# Patient Record
Sex: MALE | Race: OTHER
[De-identification: names, ages, dates, MRNs, and addresses within clinical notes are randomized per-mention and may not be internally consistent; named-entity substitution may affect disease eponyms.]

---

## 2019-02-24 ENCOUNTER — HOSPITAL ENCOUNTER (EMERGENCY)
Dept: HOSPITAL 80 - FED | Age: 59
Discharge: HOME | End: 2019-02-24
Payer: COMMERCIAL

## 2019-02-24 VITALS — DIASTOLIC BLOOD PRESSURE: 84 MMHG | SYSTOLIC BLOOD PRESSURE: 149 MMHG

## 2019-02-24 DIAGNOSIS — R07.89: Primary | ICD-10-CM

## 2019-02-24 DIAGNOSIS — E78.5: ICD-10-CM

## 2019-02-24 DIAGNOSIS — I10: ICD-10-CM

## 2019-02-24 DIAGNOSIS — E86.9: ICD-10-CM

## 2019-02-24 LAB — PLATELET # BLD: 336 10^3/UL (ref 150–400)

## 2019-02-24 NOTE — CPEKG
Test Reason : OPEN

Blood Pressure : ***/*** mmHG

Vent. Rate : 077 BPM     Atrial Rate : 077 BPM

   P-R Int : 159 ms          QRS Dur : 090 ms

    QT Int : 384 ms       P-R-T Axes : 039 -12 054 degrees

   QTc Int : 435 ms

 

Sinus rhythm

 

Confirmed by Murphy Reyes ([id number]) on 2/24/2019 2:54:18 PM

 

Referred By: PHYSICIAN ED           Confirmed By:Murphy Reyes

## 2019-02-24 NOTE — EDPHY
H & P


Stated Complaint: cp


Time Seen by Provider: 02/24/19 12:37


HPI/ROS: 





HPI





CHIEF COMPLAINT:  Right-sided chest discomfort. Only has pain when he breathes 

in takes a deep breath in.





HISTORY OF PRESENT ILLNESS:  This is a very pleasant 58-year-old male, does 

have a history of hypertension and hyperlipidemia, otherwise rather healthy, 

presents to the emergency room with right-sided chest discomfort.  Describes 

sharp stabbing pain in 1 focal area on the right anterior chest.  He denies any 

injury.  He hurts worse when he breathes in when he takes deep breath in it 

gives him this sharp pain.  This started Friday.  He has noticed it throughout 

the weekend.


He denies any left-sided chest pain, denies chest pressure or chest discomfort.

  Denies recent illness, denies fever vomiting denies productive cough.





Main complaint right-sided chest discomfort sharp stabbing worse when he takes 

deep breath in.





Patient denies any vomiting or diarrhea.  Denies right upper quadrant abdominal 

pain.








Past Medical History:  Hypertension, hyperlipidemia





Past Surgical History:  Tooth extraction





Social History:  Denies drugs alcohol tobacco.  Wife at bedside is an RN here.





Family History:  Noncontributory








ROS   


REVIEW OF SYSTEMS:


10 Systems were reviewed and negative with the exception of the elements 

mentioned in the history of present illness.








Exam   


Constitutional   triage nursing summary reviewed, vital signs reviewed, awake/

alert.  Signs stable.  Appears well nontoxic.


Eyes   normal conjunctivae and sclera, EOMI, PERRLA. 


HENT   normal inspection, atraumatic, moist mucus membranes, no epistaxis, neck 

supple/ no meningismus, no raccoon eyes. 


Respiratory   clear to auscultation bilaterally, normal breath sounds, no 

respiratory distress, no wheezing. 


Cardiovascular   rate normal, regular rhythm, no murmur, no edema, distal 

pulses normal. 


Gastrointestinal   soft, non-tender, no rebound, no guarding, normal bowel 

sounds, no distension, no pulsatile mass. 


Genitourinary   no CVA tenderness. 


Musculoskeletal  no midline vertebral tenderness, full range of motion, no calf 

swelling, no tenderness of extremities, no meningismus, good pulses, 

neurovascularly intact.


Skin   pink, warm, & dry, no rash, skin atraumatic. 


Neurologic   awake, alert and oriented x 3, AAOx3, moves all 4 extremities 

equally, motor intact, sensory intact, CN II-XII intact, normal cerebellar, 

normal vision, normal speech. 


Psychiatric   normal mood/affect. 


Heme/Lymph/Immune   no lymphadenopathy.





Differential Diagnosis:  Differential diagnosis includes but is not limited to:

  Pleurisy, pneumothorax,  ACS, atypical chest pain, pneumothorax, pneumonia, 

pulmonary embolism, aortic dissection, congestive heart failure, tumor, 

musculoskeletal pain, esophageal pain, GERD, peptic ulcer disease, pancreatitis





Medical Decision Making:  Plan for this patient IV establishment IV fluid bolus

, EKG, chest x-ray, cardiac monitor, troponin, basic labs re-evaluate Toradol.





Re-evaluation:








EKG interpretation by me on record in [employer] plate system.  Impression time of 

EKG 12:34 p.m. Sinus rhythm rate of 77 without any signs of acute ischemia.





Given the patient's pleuritic right-sided sharp stabbing chest pain worse when 

he takes deep breath in will proceed with CT angiogram of the chest rule out PE.





Discussed risk versus benefit of CT angiogram for right-sided pleuritic sharp 

stabbing pain.  Long discussion with the patient given ongoing right-sided 

pleuritic pain he has agreed for CT angiogram of the chest.


Will be premedicated as he had a remote history of possibly an allergy to 

contrast.  Will give him Solu-Medrol and Benadryl.  He did not have a history 

of anaphylaxis or throat swelling.  He did developed a rash after contrast 1 

time he thinks about 10 years ago.





CT angiogram of the chest shows no evidence of pulmonary embolism.


It does show mild cardiomegaly, as well as coronary artery disease.








EKG interpretation by me on record in [employer] system.  Impression time of 

EKG 1626, sinus rhythm rate of 72, no signs of acute ischemia or cardiac 

arrhythmia.





Patient has 2 unremarkable nonischemic EKGs.





2-troponins over 4 hr in the emergency room.





CT angiogram of the chest shows no evidence of PE.  No evidence of PE but does 

shows plaque on his LAD.  


I discussed the results of a CT scan with him.





The patient would like to go home.





He does have right-sided chest pain improved after IV Toradol.





Patient resting comfortably.





He denies any shortness of breath or left-sided chest discomfort.





We discussed return precautions he understands return emergency room if 

develops worsening chest pain shortness of breath or not doing well.





He understands any also understands follow-up with his primary care doctor.





He does report to me that when he goes to sit up or strain he does have some 

right-sided pain.





He denies any left-sided chest pain, denies any chest pressure.





Pain improved after IV Toradol.  Only has pain when he takes deep breath in.





I do recommend he follows up with his primary care doctor and Cardiology about 

his CT results.





I have printed his CT results, discussed at length at bedside with him.  He 

states he would like to go home denies any chest pain at this time.








The patient arrived to the emergency room the right-sided sharp stabbing 

pleuritic pain only when he breathes in.  His workup has been rather 

unremarkable here with 2-troponins and 2 normal EKGs.  CT angiogram negative 

for PE.  Pain improved after IV Toradol the patient like to go home I am 

comfortable this.





Should follow up with his cardiologist about his CT scan.





Return precautions discussed with him as well as his wife at bedside who is an 

RN here.








1705:  I spoke with Dr. Anselmo rose discussed the case in detail about the 

cardiomegaly seen on his CT scan and plaque.  Given that the patient has 2 

normal EKGs, 2-troponins, has pleuritic type pain on the right side only when 

he takes a deep breath and he is comfortable with him going home.  He would not 

pursue any further workup at this time.  He believes this is most likely 

pleurisy.


However he would like to follow up with him in the office this week.  I have 

discussed this with the patient as wife at bedside are comfortable this plan.


Source: Patient





- Personal History


Current Tetanus/Diphtheria Vaccine: Yes


Current Tetanus Diphtheria and Acellular Pertussis (TDAP): Yes





- Medical/Surgical History


Hx Asthma: No


Hx Chronic Respiratory Disease: No


Hx Diabetes: No


Hx Cardiac Disease: No


Hx Renal Disease: No


Hx Cirrhosis: No


Hx Alcoholism: No


Hx HIV/AIDS: No


Hx Splenectomy or Spleen Trauma: No


Other PMH: HTN, back surgery, TRIXIE knee surgery





- Social History


Smoking Status: Never smoked


Constitutional: 


 Initial Vital Signs











Temperature (C)  36.8 C   02/24/19 12:26


 


Heart Rate  77   02/24/19 12:26


 


Respiratory Rate  16   02/24/19 12:26


 


Blood Pressure  153/83 H  02/24/19 12:26


 


O2 Sat (%)  97   02/24/19 12:26








 











O2 Delivery Mode               Room Air














Allergies/Adverse Reactions: 


 





Iodinated Contrast- Oral and IV Dye [IV Dye, Iodine Containing] Allergy (

Intermediate, Verified 02/24/19 12:25)


 Hives








Home Medications: 














 Medication  Instructions  Recorded


 


Benadryl  11/22/10


 


Lisinopril  02/24/19














Medical Decision Making





- Diagnostics


Imaging Results: 


 Imaging Impressions





Chest X-Ray  02/24/19 12:48


Impression: Cardiac enlargement without findings of anna pulmonary edema.








Chest/Thorax CTA  02/24/19 12:51


Impression: 


1. No evidence of pulmonary embolus using CT protocol.


2. Mild cardiomegaly.


3. Noncalcified plaque near the origin of the left subclavian artery with only 

mild approximately 50% stenosis.


4. Mild hepatic steatosis.


5. Mild calcified plaque proximal LAD.


 


Findings discussed with Erick Leblanc MD  at  15:19 hour, 2/24/2019.


 


 














- Data Points


Laboratory Results: 


 Laboratory Results





 02/24/19 12:35 





 02/24/19 12:35 





 











  02/24/19 02/24/19 02/24/19





  16:29 12:40 12:35


 


WBC      





    


 


RBC      





    


 


Hgb      





    


 


Hct      





    


 


MCV      





    


 


MCH      





    


 


MCHC      





    


 


RDW      





    


 


Plt Count      





    


 


MPV      





    


 


Neut % (Auto)      





    


 


Lymph % (Auto)      





    


 


Mono % (Auto)      





    


 


Eos % (Auto)      





    


 


Baso % (Auto)      





    


 


Nucleat RBC Rel Count      





    


 


Absolute Neuts (auto)      





    


 


Absolute Lymphs (auto)      





    


 


Absolute Monos (auto)      





    


 


Absolute Eos (auto)      





    


 


Absolute Basos (auto)      





    


 


Absolute Nucleated RBC      





    


 


Immature Gran %      





    


 


Immature Gran #      





    


 


Sodium      139 mEq/L mEq/L





     (135-145) 


 


Potassium      4.1 mEq/L mEq/L





     (3.5-5.2) 


 


Chloride      104 mEq/L mEq/L





     () 


 


Carbon Dioxide      25 mEq/l mEq/l





     (22-31) 


 


Anion Gap      10 mEq/L mEq/L





     (6-14) 


 


BUN      17 mg/dL mg/dL





     (7-23) 


 


Creatinine      1.1 mg/dL mg/dL





     (0.7-1.3) 


 


Estimated GFR      > 60 





    


 


Glucose      120 mg/dL H mg/dL





     () 


 


Calcium      9.8 mg/dL mg/dL





     (8.5-10.4) 


 


Magnesium      2.0 mg/dL mg/dL





     (1.6-2.3) 


 


Total Bilirubin      0.6 mg/dL mg/dL





     (0.1-1.4) 


 


Conjugated Bilirubin      0.3 mg/dL mg/dL





     (0.0-0.5) 


 


Unconjugated Bilirubin      0.3 mg/dL mg/dL





     (0.0-1.1) 


 


AST      33 IU/L IU/L





     (17-59) 


 


ALT      61 IU/L IU/L





     (21-72) 


 


Alkaline Phosphatase      106 IU/L IU/L





     () 


 


POC Troponin I  0.00 ng/mL ng/mL  0.00 ng/mL ng/mL  





   (0.00-0.08)   (0.00-0.08)  


 


NT-Pro-B Natriuret Pep      22 pg/mL pg/mL





     (0-125) 


 


Total Protein      7.4 g/dL g/dL





     (6.3-8.2) 


 


Albumin      4.4 g/dL g/dL





     (3.5-5.0) 














  02/24/19





  12:35


 


WBC  7.27 10^3/uL 10^3/uL





   (3.80-9.50) 


 


RBC  5.83 10^6/uL 10^6/uL





   (4.40-6.38) 


 


Hgb  17.1 g/dL g/dL





   (13.7-17.5) 


 


Hct  48.4 % %





   (40.0-51.0) 


 


MCV  83.0 fL fL





   (81.5-99.8) 


 


MCH  29.3 pg pg





   (27.9-34.1) 


 


MCHC  35.3 g/dL g/dL





   (32.4-36.7) 


 


RDW  12.3 % %





   (11.5-15.2) 


 


Plt Count  336 10^3/uL 10^3/uL





   (150-400) 


 


MPV  9.6 fL fL





   (8.7-11.7) 


 


Neut % (Auto)  70.9 % %





   (39.3-74.2) 


 


Lymph % (Auto)  22.6 % %





   (15.0-45.0) 


 


Mono % (Auto)  4.8 % %





   (4.5-13.0) 


 


Eos % (Auto)  1.0 % %





   (0.6-7.6) 


 


Baso % (Auto)  0.4 % %





   (0.3-1.7) 


 


Nucleat RBC Rel Count  0.0 % %





   (0.0-0.2) 


 


Absolute Neuts (auto)  5.16 10^3/uL 10^3/uL





   (1.70-6.50) 


 


Absolute Lymphs (auto)  1.64 10^3/uL 10^3/uL





   (1.00-3.00) 


 


Absolute Monos (auto)  0.35 10^3/uL 10^3/uL





   (0.30-0.80) 


 


Absolute Eos (auto)  0.07 10^3/uL 10^3/uL





   (0.03-0.40) 


 


Absolute Basos (auto)  0.03 10^3/uL 10^3/uL





   (0.02-0.10) 


 


Absolute Nucleated RBC  0.00 10^3/uL 10^3/uL





   (0-0.01) 


 


Immature Gran %  0.3 % %





   (0.0-1.1) 


 


Immature Gran #  0.02 10^3/uL 10^3/uL





   (0.00-0.10) 


 


Sodium  





  


 


Potassium  





  


 


Chloride  





  


 


Carbon Dioxide  





  


 


Anion Gap  





  


 


BUN  





  


 


Creatinine  





  


 


Estimated GFR  





  


 


Glucose  





  


 


Calcium  





  


 


Magnesium  





  


 


Total Bilirubin  





  


 


Conjugated Bilirubin  





  


 


Unconjugated Bilirubin  





  


 


AST  





  


 


ALT  





  


 


Alkaline Phosphatase  





  


 


POC Troponin I  





  


 


NT-Pro-B Natriuret Pep  





  


 


Total Protein  





  


 


Albumin  





  











Medications Given: 


 








Discontinued Medications





Diphenhydramine HCl (Benadryl Injection)  25 mg IVP EDNOW ONE


   Stop: 02/24/19 12:59


   Last Admin: 02/24/19 13:04 Dose:  25 mg


Sodium Chloride (Ns)  1,000 mls @ 0 mls/hr IV EDNOW ONE; Wide Open


   PRN Reason: Protocol


   Stop: 02/24/19 12:49


   Last Admin: 02/24/19 12:53 Dose:  1,000 mls


Ketorolac Tromethamine (Toradol)  15 mg IVP EDNOW ONE


   Stop: 02/24/19 13:23


   Last Admin: 02/24/19 13:32 Dose:  15 mg


Methylprednisolone Sodium Succinate (Solu-Medrol)  125 mg IVP EDNOW ONE


   Stop: 02/24/19 12:59


   Last Admin: 02/24/19 13:04 Dose:  125 mg





Point of Care Test Results: 


 Chemistry











  02/24/19 02/24/19





  16:29 12:40


 


POC Troponin I  0.00 ng/mL ng/mL  0.00 ng/mL ng/mL





   (0.00-0.08)   (0.00-0.08) 














Departure





- Departure


Disposition: Home, Routine, Self-Care


Clinical Impression: 


 Pleuritic pain





Condition: Good


Instructions:  Pleurisy (ED)


Additional Instructions: 


1. Return to the emergency room if you develop worsening chest pain.


2. Please follow up with your primary care doctor


3. Anti-inflammatory pain medicine.


4. Please follow up with Cardiology as well this week.


5. Return to the emergency room if worsening symptoms.


Referrals: 


Ewa Dhillon MD [Primary Care Provider] - As per Instructions


Meng Rose MD [Medical Doctor] - As per Instructions

## 2019-02-24 NOTE — CPEKG
Test Reason : OPEN

Blood Pressure : ***/*** mmHG

Vent. Rate : 072 BPM     Atrial Rate : 072 BPM

   P-R Int : 165 ms          QRS Dur : 089 ms

    QT Int : 393 ms       P-R-T Axes : 022 -17 019 degrees

   QTc Int : 431 ms

 

Sinus rhythm

Borderline left axis deviation

 

Confirmed by Erick Leblanc ([id number]) on 2/24/2019 7:51:28 PM

 

Referred By: Erick Leblanc           Confirmed By:Erick Leblanc